# Patient Record
Sex: MALE | Race: WHITE
[De-identification: names, ages, dates, MRNs, and addresses within clinical notes are randomized per-mention and may not be internally consistent; named-entity substitution may affect disease eponyms.]

---

## 2019-02-07 ENCOUNTER — HOSPITAL ENCOUNTER (EMERGENCY)
Age: 69
Discharge: HOME | End: 2019-02-07
Payer: MEDICARE

## 2019-02-07 VITALS
BODY MASS INDEX: 28 KG/M2 | SYSTOLIC BLOOD PRESSURE: 107 MMHG | TEMPERATURE: 100.94 F | DIASTOLIC BLOOD PRESSURE: 57 MMHG | HEART RATE: 76 BPM | OXYGEN SATURATION: 95 % | RESPIRATION RATE: 18 BRPM

## 2019-02-07 VITALS
HEART RATE: 72 BPM | RESPIRATION RATE: 14 BRPM | DIASTOLIC BLOOD PRESSURE: 66 MMHG | TEMPERATURE: 100.5 F | OXYGEN SATURATION: 95 % | SYSTOLIC BLOOD PRESSURE: 112 MMHG

## 2019-02-07 DIAGNOSIS — T14.8XXA: Primary | ICD-10-CM

## 2019-02-07 DIAGNOSIS — R50.82: ICD-10-CM

## 2019-02-07 LAB
ADD MANUAL DIFF / SLIDE REVIEW: NO
APPEARANCE UR: CLEAR
BILIRUBIN URINE UA: NEGATIVE
BUN SERPL-MCNC: 14 MG/DL (ref 9–20)
CALCIUM SERPL-MCNC: 8.5 MG/DL (ref 8.4–10.2)
CHLORIDE SERPL-SCNC: 98 MMOL/L (ref 98–107)
CO2 SERPL-SCNC: 30 MMOL/L (ref 22–32)
COLOR UR: (no result)
ESTIMATED GLOMERULAR FILT RATE: > 60 ML/MIN (ref 60–?)
GLUCOSE SERPL-MCNC: 166 MG/DL (ref 80–110)
GLUCOSE URINE UA: NEGATIVE G/DL
HEMATOCRIT: 36.7 % (ref 41–53)
HEMOGLOBIN: 12.5 G/DL (ref 13.5–17.5)
HEMOLYSIS: < 15 (ref 0–50)
HGB UR QL: NEGATIVE
KETONES URINE UA: (no result)
LACTATE SERPL-MCNC: 1 MMOL/L (ref 0.7–2.1)
LEUKOCYTE ESTERASE URINE UA: NEGATIVE
LYMPHOCYTES # SPEC AUTO: 600 /UL (ref 1100–4500)
MCV RBC: 98 FL (ref 80–100)
MEAN CORPUSCULAR HEMOGLOBIN: 33.4 PG (ref 26–34)
MEAN CORPUSCULAR HGB CONC: 34.1 % (ref 30–36)
NITRITE URINE UA: NEGATIVE
PH UR: 5.5 [PH] (ref 4.5–8)
PLATELET COUNT: 123 X10^3/UL (ref 150–400)
POTASSIUM SERPL-SCNC: 4 MMOL/L (ref 3.4–5.1)
PROTEIN URINE UA: (no result)
SODIUM SERPL-SCNC: 134 MMOL/L (ref 137–145)
SP GR UR: 1.02 (ref 1–1.03)
UROBILINOGEN UR QL: 0.2 E.U./DL

## 2019-02-07 PROCEDURE — 81001 URINALYSIS AUTO W/SCOPE: CPT

## 2019-02-07 PROCEDURE — 85025 COMPLETE CBC W/AUTO DIFF WBC: CPT

## 2019-02-07 PROCEDURE — 96360 HYDRATION IV INFUSION INIT: CPT

## 2019-02-07 PROCEDURE — 93971 EXTREMITY STUDY: CPT

## 2019-02-07 PROCEDURE — 83605 ASSAY OF LACTIC ACID: CPT

## 2019-02-07 PROCEDURE — 99284 EMERGENCY DEPT VISIT MOD MDM: CPT

## 2019-02-07 PROCEDURE — 87040 BLOOD CULTURE FOR BACTERIA: CPT

## 2019-02-07 PROCEDURE — 36415 COLL VENOUS BLD VENIPUNCTURE: CPT

## 2019-02-07 PROCEDURE — 99283 EMERGENCY DEPT VISIT LOW MDM: CPT

## 2019-02-07 PROCEDURE — 80048 BASIC METABOLIC PNL TOTAL CA: CPT

## 2019-02-07 PROCEDURE — 36591 DRAW BLOOD OFF VENOUS DEVICE: CPT

## 2019-02-07 NOTE — ED_ITS
"HPI - Extremity Injury (Lower)    
General    
Chief Complaint: Extremity Injury, Lower    
Stated Complaint: Bleeding at dressing    
Time Seen by Provider: 02/07/19 03:49    
Source: patient and EMS    
Mode of arrival: EMS    
Limitations: no limitations    
History of Present Illness    
HPI Narrative: Patient is a 68-year-old male who presents with left his knee   
bleeding.  He had a total knee arthroplasty is 3 days ago he was discharged the   
2 days ago.  Yesterday he started his exercises. this evening he noticed that   
there was bleeding at the incision site and there was her very large blood clot   
which drip from the bandage.  He does have some postoperative swelling. He is   
noted to have low-grade fever of 100.9.  He denies knowing of any fever before   
this.  He was feeling fine earlier.    
Related Data    
                                Home Medications    
    
    
    
 Medication  Instructions  Recorded  Confirmed    
     
[flaxseed oil] #0 01/17/17     
     
ascorbic acid (vitamin C) 500 mg PO QDAY #0 01/17/17     
     
cholecalciferol (vitamin D3) 1,000 unit PO QDAY #0 01/17/17     
    
[Vitamin D3]       
     
ginkgo biloba 120 mg PO #0 01/17/17     
     
multivitamin [Multiple Vitamins] 1 tab PO QDAY #0 01/17/17     
     
omega 3-dha-epa-fish oil [Fish Oil] 1,000 mg PO #0 01/17/17     
    
    
    
                                    Allergies    
    
    
    
Allergy/AdvReac Type Severity Reaction Status Date / Time    
     
azithromycin [From ZITHROMAX] Allergy Unknown  Verified 02/07/19 04:03    
     
oxycodone [OXYCODONE] Allergy Unknown RASH Verified 02/07/19 04:03    
     
Sulfa (Sulfonamide Allergy Unknown  Verified 02/07/19 04:03    
    
Antibiotics)         
    
[SULFA (SULFONAMIDE         
    
ANTIBIOTICS)]         
    
    
    
    
Review of Systems    
Review of Systems    
ROS Unobtainable: All systems reviewed & are unremarkable except as noted in HPI  
and below    
Constitutional    
Denies body ache(s), Denies chills and Reports fever(s)    
Eyes    
Denies change in vision, Denies eye discharge, Denies irritation and Denies loss  
of vision    
Cardiovascular    
Denies chest pain, Denies irregular heart rhythm, Denies lightheadedness, Denies  
palpitations, Denies dyspnea, Denies dyspnea on exertion and Denies orthopnea    
Respiratory    
Denies cough, Denies dyspnea, Denies dyspnea on exertion and Denies wheezing    
Gastrointestinal    
Gastrointestinal: Denies abdominal pain, Denies change in bowel habits, Denies   
diarrhea, Denies nausea and Denies vomiting    
Genitourinary    
Denies hematuria, Denies flank pain, Denies urinary incontinence and Denies   
urinary urgency    
Musculoskeletal    
Denies back pain, Denies muscle weakness, Denies numbness and Denies tingling    
Integumentary/Breasts    
Denies pruritus, Denies erythema, Denies rash and Denies wounds    
Neurologic    
Denies loss of vision, Denies numbness and Denies tingling    
Endocrine    
Denies palpitations    
Allergic/Immunologic    
Denies wheezing    
    
PFSH    
Medical History (Reviewed 02/07/19 @ 04:55 by Rylee Huber DO)    
    
Hypertension (Chronic)    
    
    
Surgical History (Reviewed 02/07/19 @ 04:55 by Rylee Huber DO)    
    
H/O total knee replacement (Acute)    
    
Social History (Reviewed 02/07/19 @ 04:55 by Rylee Huber DO)    
Smoking Status:  Never smoker     
    
    
Social History (Reviewed 02/07/19 @ 04:55 by Rylee Huber DO)    
Smoking Status:  Never smoker     
    
    
    
Exam    
Initial Vital Signs    
Initial Vital Signs:               Vital Signs    
    
    
    
Temperature  100.9 F H  02/07/19 03:30    
     
Pulse Rate  76   02/07/19 03:30    
     
Respiratory Rate  18   02/07/19 03:30    
     
Blood Pressure  107/57 L  02/07/19 03:30    
     
Pulse Oximetry  95   02/07/19 03:30    
    
    
[id number]|2019-02-07 03:52:00|2019-02-07 07:23:00|DI.US.S_ITS|[facility]|Imaging|[id number]|"PROCEDURE:  US PERIPH VENOUS LOW EXTREM LT

## 2019-02-07 NOTE — ED.LOWEXIN
"HPI - Extremity Injury (Lower)
General
Chief Complaint: Extremity Injury, Lower
Stated Complaint: Bleeding at dressing
Time Seen by Provider: 02/07/19 03:49
Source: patient and EMS
Mode of arrival: EMS
Limitations: no limitations
History of Present Illness
HPI Narrative: Patient is a 68-year-old male who presents with left his knee bleeding.  He had a total knee arthroplasty is 3 days ago he was discharged the 2 days ago.  Yesterday he started his exercises. this evening he noticed that there was 
bleeding at the incision site and there was her very large blood clot which drip from the bandage.  He does have some postoperative swelling. He is noted to have low-grade fever of 100.9.  He denies knowing of any fever before this.  He was feeling 
fine earlier.
Related Data
Home Medications

 Medication  Instructions  Recorded  Confirmed
[flaxseed oil] #0 01/17/17 
ascorbic acid (vitamin C) 500 mg PO QDAY #0 01/17/17 
cholecalciferol (vitamin D3) 1,000 unit PO QDAY #0 01/17/17 
[Vitamin D3]   
ginkgo biloba 120 mg PO #0 01/17/17 
multivitamin [Multiple Vitamins] 1 tab PO QDAY #0 01/17/17 
omega 3-dha-epa-fish oil [Fish Oil] 1,000 mg PO #0 01/17/17 


Allergies

Allergy/AdvReac Type Severity Reaction Status Date / Time
azithromycin [From ZITHROMAX] Allergy Unknown  Verified 02/07/19 04:03
oxycodone [OXYCODONE] Allergy Unknown RASH Verified 02/07/19 04:03
Sulfa (Sulfonamide Allergy Unknown  Verified 02/07/19 04:03
Antibiotics)     
[SULFA (SULFONAMIDE     
ANTIBIOTICS)]     



Review of Systems
Review of Systems
ROS Unobtainable: All systems reviewed & are unremarkable except as noted in HPI and below
Constitutional
Denies body ache(s), Denies chills and Reports fever(s)
Eyes
Denies change in vision, Denies eye discharge, Denies irritation and Denies loss of vision
Cardiovascular
Denies chest pain, Denies irregular heart rhythm, Denies lightheadedness, Denies palpitations, Denies dyspnea, Denies dyspnea on exertion and Denies orthopnea
Respiratory
Denies cough, Denies dyspnea, Denies dyspnea on exertion and Denies wheezing
Gastrointestinal
Gastrointestinal: Denies abdominal pain, Denies change in bowel habits, Denies diarrhea, Denies nausea and Denies vomiting
Genitourinary
Denies hematuria, Denies flank pain, Denies urinary incontinence and Denies urinary urgency
Musculoskeletal
Denies back pain, Denies muscle weakness, Denies numbness and Denies tingling
Integumentary/Breasts
Denies pruritus, Denies erythema, Denies rash and Denies wounds
Neurologic
Denies loss of vision, Denies numbness and Denies tingling
Endocrine
Denies palpitations
Allergic/Immunologic
Denies wheezing

[facility]
Medical History (Reviewed 02/07/19 @ 04:55 by Rylee Huber DO)

Hypertension (Chronic)


Surgical History (Reviewed 02/07/19 @ 04:55 by Rylee Huber DO)

H/O total knee replacement (Acute)

Social History (Reviewed 02/07/19 @ 04:55 by Rylee Huber DO)
Smoking Status:  Never smoker 


Social History (Reviewed 02/07/19 @ 04:55 by Rylee Huber DO)
Smoking Status:  Never smoker 



Exam
Initial Vital Signs
Initial Vital Signs:  Vital Signs

Temperature  100.9 F H  02/07/19 03:30
Pulse Rate  76   02/07/19 03:30
Respiratory Rate  18   02/07/19 03:30
Blood Pressure  107/57 L  02/07/19 03:30
Pulse Oximetry  95   02/07/19 03:30

GENERAL: Well-appearing, well-nourished and in no acute distress.
HEENT: Head atraumatic,EOMI, pupils reactive
CARDIOVASCULAR: Regular rate and rhythm without murmurs, rubs or gallops.
RESPIRATORY: Breath sounds equal bilaterally, no wheezes rales or rhonchi.
ABDOMEN: Soft, nontender.  Normoactive bowel sounds all 4 quadrants.  No guarding or rebound.
EXTREMITIES: Normal range of motion, no clubbing or edema.  Neurovascularly intact
 Left knee:  Postoperative changes noted some swelling.  No erythema.  Incision site is clean and dry small and at the most distal end of the incision there is some oozing.  Is easily controlled with pr
[id number]|2019-02-07 04:00:00|2019-02-07 04:00:00|PC.NURSE||||"Pt s/p left knee surgery on 2/4/18, states went to bathroom and knee bleeding beyond aquacell bandage applied. Bandage removed, surgical site bleeding at base of incision, Dr in room applied surgicel and coban to knee temporarily then replaced with

## 2019-02-07 NOTE — PC.NURSE
and RN updated pt on wait for urine still pending in lab due to extended wait time for results per lab to come back to ER.  Dr Huber spoke with  to get a status of pending urine and gave pt option to DC and pt would be notified of 
results in which pt declined and chose to wait for urine results prior to DC.

## 2019-10-30 ENCOUNTER — HOSPITAL ENCOUNTER (EMERGENCY)
Age: 69
Discharge: HOME | End: 2019-10-30
Payer: MEDICARE

## 2019-10-30 VITALS
DIASTOLIC BLOOD PRESSURE: 85 MMHG | HEART RATE: 80 BPM | OXYGEN SATURATION: 99 % | RESPIRATION RATE: 20 BRPM | SYSTOLIC BLOOD PRESSURE: 177 MMHG

## 2019-10-30 VITALS
OXYGEN SATURATION: 98 % | DIASTOLIC BLOOD PRESSURE: 80 MMHG | RESPIRATION RATE: 18 BRPM | SYSTOLIC BLOOD PRESSURE: 174 MMHG | HEART RATE: 76 BPM

## 2019-10-30 VITALS
HEART RATE: 78 BPM | DIASTOLIC BLOOD PRESSURE: 80 MMHG | OXYGEN SATURATION: 99 % | RESPIRATION RATE: 18 BRPM | SYSTOLIC BLOOD PRESSURE: 159 MMHG

## 2019-10-30 VITALS
HEART RATE: 80 BPM | SYSTOLIC BLOOD PRESSURE: 176 MMHG | RESPIRATION RATE: 22 BRPM | OXYGEN SATURATION: 97 % | DIASTOLIC BLOOD PRESSURE: 82 MMHG

## 2019-10-30 VITALS — TEMPERATURE: 97.5 F

## 2019-10-30 DIAGNOSIS — R20.0: ICD-10-CM

## 2019-10-30 DIAGNOSIS — I10: Primary | ICD-10-CM

## 2019-10-30 LAB
ADD MANUAL DIFF / SLIDE REVIEW: NO
ALBUMIN SERPL-MCNC: 4.5 G/DL (ref 3.5–5)
ALBUMIN/GLOB SERPL: 1.5 {RATIO} (ref 1–2.8)
ALP SERPL-CCNC: 62 U/L (ref 38–126)
ALT SERPL-CCNC: 26 IU/L (ref 21–72)
B TYPE NATRIURETIC PEPTIDE: < 100 (ref ?–100)
BUN SERPL-MCNC: 19 MG/DL (ref 9–20)
CALCIUM SERPL-MCNC: 9.5 MG/DL (ref 8.4–10.2)
CHLORIDE SERPL-SCNC: 102 MMOL/L (ref 98–107)
CK SERPL-CCNC: 83 U/L (ref 55–170)
CKMB % RELATIVE INDEX: (no result) % (ref 1.5–5)
CO2 SERPL-SCNC: 29 MMOL/L (ref 22–32)
ESTIMATED GLOMERULAR FILT RATE: > 60 ML/MIN (ref 60–?)
GLOBULIN SER CALC-MCNC: 3 G/DL (ref 1.7–4.1)
GLUCOSE SERPL-MCNC: 114 MG/DL (ref 80–110)
HEMATOCRIT: 41.9 % (ref 41–53)
HEMOGLOBIN: 14.3 G/DL (ref 13.5–17.5)
HEMOLYSIS: 39 (ref 0–50)
LIPASE SERPL-CCNC: 123 U/L (ref 23–300)
LYMPHOCYTES # SPEC AUTO: 1700 /UL (ref 1100–4500)
MCV RBC: 93.6 FL (ref 80–100)
MEAN CORPUSCULAR HEMOGLOBIN: 32 PG (ref 26–34)
MEAN CORPUSCULAR HGB CONC: 34.2 % (ref 30–36)
PLATELET COUNT: 163 X10^3/UL (ref 150–400)
POTASSIUM SERPL-SCNC: 4 MMOL/L (ref 3.4–5.1)
PROT SERPL-MCNC: 7.5 G/DL (ref 6.3–8.2)
SODIUM SERPL-SCNC: 139 MMOL/L (ref 137–145)
TROPONIN I SERPL-MCNC: < 0.012 NG/ML (ref 0.01–0.03)

## 2019-10-30 PROCEDURE — 71045 X-RAY EXAM CHEST 1 VIEW: CPT

## 2019-10-30 PROCEDURE — 82550 ASSAY OF CK (CPK): CPT

## 2019-10-30 PROCEDURE — 84484 ASSAY OF TROPONIN QUANT: CPT

## 2019-10-30 PROCEDURE — 93005 ELECTROCARDIOGRAM TRACING: CPT

## 2019-10-30 PROCEDURE — 83880 ASSAY OF NATRIURETIC PEPTIDE: CPT

## 2019-10-30 PROCEDURE — 80053 COMPREHEN METABOLIC PANEL: CPT

## 2019-10-30 PROCEDURE — 70450 CT HEAD/BRAIN W/O DYE: CPT

## 2019-10-30 PROCEDURE — 36415 COLL VENOUS BLD VENIPUNCTURE: CPT

## 2019-10-30 PROCEDURE — 96360 HYDRATION IV INFUSION INIT: CPT

## 2019-10-30 PROCEDURE — 99283 EMERGENCY DEPT VISIT LOW MDM: CPT

## 2019-10-30 PROCEDURE — 99285 EMERGENCY DEPT VISIT HI MDM: CPT

## 2019-10-30 PROCEDURE — 85025 COMPLETE CBC W/AUTO DIFF WBC: CPT

## 2019-10-30 PROCEDURE — 83690 ASSAY OF LIPASE: CPT

## 2019-10-30 NOTE — PC.NURSE
He has no pain,no sob,the right arm numb sensation is not new he told me.He said he has had it for years.

## 2019-10-30 NOTE — DI.CT.S_ITS
PROCEDURE:  CT HEAD/BRAIN WO CON  
   
INDICATIONS:  Hypertension, numbness of left arm and left leg  
   
TECHNIQUE:    
Noncontrast 4.5 mm thick angled axial sections acquired from the foramen magnum to the   
vertex, with coronal and sagittal reformats.  For radiation dose reduction, the following   
was used:  automated exposure control, adjustment of mA and/or kV according to patient   
size.    
   
COMPARISON:  None.  
   
FINDINGS:    
Image quality:  Excellent.    
   
CSF spaces:  Basal cisterns are patent.  No extra-axial fluid collections.  The   
ventricles are symmetric in size and shape.    
   
Brain:  No intracranial bleeds or masses.  There is cerebral volume loss for age, with   
resultant ventricular and sulcal prominence.  There are periventricular and deep white   
matter chronic small vessel ischemic changes.  There is intracranial internal carotid   
artery atherosclerosis.    
   
Skull and face:  Calvarium and visualized facial bones appear intact, without suspicious   
lesions.    
   
Sinuses: Mucosal thickening noted in the right sphenoid sinus. And the visualized right   
maxillary sinus. mastoids are clear.    
   
IMPRESSION:  No acute intracranial disease process.  
   
   
   
Dictated by: Tammy Ricci MD, PhD on 10/30/2019 at 7:31       
Approved by: Tammy Ricci MD, PhD on 10/30/2019 at 7:33

## 2019-10-30 NOTE — DI.RAD.S_ITS
PROCEDURE:  XR CHEST 1V  
   
INDICATIONS:  chest pain, hypertension  
   
TECHNIQUE:  One view of the chest was acquired.    
   
COMPARISON: Single view chest radiograph 12/9/17, [facility].  
   
FINDINGS:    
   
Surgical changes and devices:  None.    
   
Lungs and pleura:  There is bilateral prominence of the pulmonary vasculature. There are   
increased bilateral predominantly perihilar linear opacities. There are also hazy and   
linear opacities of the medial right lung base. No pleural effusions or pneumothorax.    
   
Mediastinum:  Mediastinal contours appear normal.  Heart size is normal.    
   
Bones and chest wall:  No suspicious bony lesions.  Overlying soft tissues appear   
unremarkable.    
   
IMPRESSION:    
1. Increased bilateral predominantly perihilar linear opacities most consistent with   
atelectasis and prominent pulmonary vasculature, versus findings of early minimal   
pulmonary edema.  
2. Medial right lung base opacities likely represent atelectasis, although aspiration or   
pneumonia could appear similar. Consider followup chest radiographs if there is continued   
clinical concern.  
   
   
Dictated by: Dieudonne Sinclair M.D. on 10/30/2019 at 9:12       
Approved by: Dieudonne Sinclair M.D. on 10/30/2019 at 9:21

## 2019-10-30 NOTE — ED_ITS
"HPI - General Adult    
General    
Chief complaint: Hypertension    
Stated complaint: woke w/left arm numbness/don't feel right bp high    
Time Seen by Provider: 10/30/19 04:24    
Source: patient    
Mode of arrival: Ambulatory    
Limitations: no limitations    
History of Present Illness    
HPI narrative: 69-year-old male nonsmoker with history of hypertension and   
hyperlipidemia awoke feeling ?funny? and checked his blood pressure noted to be   
elevated, he has checked it a few times and at most noted to be in the 200s over  
about 110. He denies any headache or blurred vision.  He has had no difficulty   
with speech nor chest pain or shortness of breath.  He does complain of some   
tingling in atypical feeling in his left arm and mentions that his left knee   
feels weird also.  He then clarifies and states that his left arm has been   
having symptoms like this since the 1970s He denies missing any medications nor   
has he had any alterations in the dosages.  He receives his primary care at the   
[facility] in [place].  He denies any provocation or palliation of the numbness   
and tingling in his left arm.  He states that exertion does not change things   
nor does moving his arm.  He went to bed feeling normal at 1:00 a.m. and woke up  
at 2:30 a.m. with these symptoms     
Onset (ago): hour(s)    
Location: left, upper extremity and lower extremity    
Radiation: non-radiation    
Relieving factors: none    
Exacerbating factors: none    
Treatments prior to arrival: none    
Related Data    
                                Home Medications    
    
    
    
 Medication  Instructions  Recorded  Confirmed    
     
[flaxseed oil] #0 01/17/17     
     
ascorbic acid (vitamin C) 500 mg PO QDAY #0 01/17/17     
     
cholecalciferol (vitamin D3) 1,000 unit PO QDAY #0 01/17/17     
    
[Vitamin D3]       
     
ginkgo biloba 120 mg PO #0 01/17/17     
     
multivitamin [Multiple Vitamins] 1 tab PO QDAY #0 01/17/17     
     
omega 3-dha-epa-fish oil [Fish Oil] 1,000 mg PO #0 01/17/17     
    
    
    
                                    Allergies    
    
    
    
Allergy/AdvReac Type Severity Reaction Status Date / Time    
     
azithromycin [From ZITHROMAX] Allergy Unknown  Verified 02/07/19 04:03    
     
oxycodone [OXYCODONE] Allergy Unknown RASH Verified 02/07/19 04:03    
     
Sulfa (Sulfonamide Allergy Unknown  Verified 02/07/19 04:03    
    
Antibiotics)         
    
[SULFA (SULFONAMIDE         
    
ANTIBIOTICS)]         
    
    
    
    
Review of Systems    
Constitutional    
Constitutional: Denies chills, Denies fatigue, Denies fever(s), Denies frequent   
falls, Denies lethargy and Denies weakness    
Eyes    
Eyes: Denies change in vision, Denies eye discharge, Denies irritation and   
Denies loss of vision    
ENT    
Ears, Nose, Mouth, and Throat: Denies change in voice, Denies dizziness, Denies   
neck pain, Denies sore throat and Denies throat swelling    
Cardiovascular    
Cardiovascular: Denies chest pain, Denies irregular heart rhythm, Denies   
lightheadedness, Denies palpitations, Denies dyspnea, Denies dyspnea on exertion  
and Denies orthopnea    
Respiratory    
Respiratory: Denies cough, Denies dyspnea, Denies dyspnea on exertion and Denies  
wheezing    
Gastrointestinal    
Gastrointestinal: Denies abdominal pain, Denies change in bowel habits, Denies   
diarrhea, Denies nausea and Denies vomiting    
Genitourinary    
Genitourinary: Denies hematuria, Denies flank pain, Denies urinary incontinence   
and Denies urinary urgency    
Musculoskeletal    
Musculoskeletal: Denies back pain, Denies muscle weakness, Denies neck pain,   
Denies numbness and Reports tingling    
Integumentary/Breasts    
Skin/Breast: Denies pruritus, Denies erythema, Denies rash and Denies wounds    
Neurologic    
Neurologic: Denies behavioral changes, Denies confusion, Denies dizziness,   

[id number]|2019-10-30 04:26:36|2019-10-30 04:26:36|ED.GENADULT||||"HPI - General Adult

## 2020-12-28 ENCOUNTER — HOSPITAL ENCOUNTER (EMERGENCY)
Age: 70
Discharge: HOME | End: 2020-12-28
Payer: MEDICARE

## 2020-12-28 VITALS
OXYGEN SATURATION: 98 % | TEMPERATURE: 98.24 F | RESPIRATION RATE: 20 BRPM | DIASTOLIC BLOOD PRESSURE: 86 MMHG | HEART RATE: 73 BPM | SYSTOLIC BLOOD PRESSURE: 162 MMHG

## 2020-12-28 DIAGNOSIS — I10: ICD-10-CM

## 2020-12-28 DIAGNOSIS — R22.42: Primary | ICD-10-CM

## 2020-12-28 PROCEDURE — 99283 EMERGENCY DEPT VISIT LOW MDM: CPT

## 2020-12-28 PROCEDURE — 93971 EXTREMITY STUDY: CPT

## 2020-12-28 NOTE — DI.US.S_ITS
PROCEDURE:  US PERIP VENOUS LOW EXTREM LT  
   
INDICATIONS:  PAIN, SWELLING, RECENT SURGERY. PLEASE GO BELOW KNEE  
   
TECHNIQUE:    
Real-time imaging, as well as color and pulse Doppler interrogation, were performed of   
the lower extremity deep veins from the inguinal ligament to the popliteal fossa.    
   
COMPARISON:  [facility], , [facility] VENOUS LOW EXTREM LT, 2/07/2019, 4:48.  
   
FINDINGS:  The common femoral, femoral and popliteal veins are normally compressible, and   
free of intraluminal thrombus.  Color and pulse Doppler demonstrate normal phasic   
intraluminal flow.  There is normal augmentation response to distal compression maneuver.   
   
   
IMPRESSION:  No DVT found left lower extremity.  
   
   
Dictated by: Yusuf Blair M.D. on 12/28/2020 at 16:17       
Approved by: Yusuf Blair M.D. on 12/28/2020 at 16:18

## 2020-12-29 NOTE — ED_ITS
"HPI - Extremity Problem    
General    
Chief complaint: Extremity Problem,Nontraumatic    
Stated complaint: Suspected Blood Clot, Lt Foot, Post Op 12/07    
Time Seen by Provider: 12/28/20 15:11    
Source: patient    
Mode of arrival: Ambulatory    
Limitations: no limitations    
History of Present Illness    
HPI Narrative: 70-year-old male nonsmoker with benign medical history presents   
with his wife in the chief complaint of some swelling in the toes of his left   
foot in the recent aftermath of an orthopedic surgery performed in [place].  He   
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   
of a possible clot.  He has been on antibiotics for possible wound infection or   
cellulitis also.  He denies systemic findings such as fever chills nor nausea or  
vomiting.  He denies any exposure to persons known with COVID.  He is otherwise   
well and free of complaint.  He denies any significant increasing pain or   
drainage.  He has no pain or swelling in his calf or thigh, only the toes    
MD Complaint: extremity pain and joint swelling    
Onset (ago): day(s)    
Pain Consistency: constant    
Location: left    
Quality: aching    
Radiation: none    
Relieving factors: rest    
Exacerbating factors: weight bearing and palpation    
Associated symptoms: denies other symptoms    
Related Data    
                                Home Medications    
    
    
    
 Medication  Instructions  Recorded  Confirmed    
     
[flaxseed oil] #0 01/17/17     
     
ascorbic acid (vitamin C) 500 mg PO QDAY #0 01/17/17     
     
cholecalciferol (vitamin D3) 1,000 unit PO QDAY #0 01/17/17     
    
[Vitamin D3]       
     
ginkgo biloba 120 mg PO #0 01/17/17     
     
multivitamin [Multiple Vitamins] 1 tab PO QDAY #0 01/17/17     
     
omega 3-dha-epa-fish oil [Fish Oil] 1,000 mg PO #0 01/17/17     
    
    
    
                                    Allergies    
    
    
    
Allergy/AdvReac Type Severity Reaction Status Date / Time    
     
azithromycin [From ZITHROMAX] Allergy Unknown  Verified 02/07/19 04:03    
     
oxycodone [OXYCODONE] Allergy Unknown RASH Verified 02/07/19 04:03    
     
Sulfa (Sulfonamide Allergy Unknown  Verified 02/07/19 04:03    
    
Antibiotics)         
    
[SULFA (SULFONAMIDE         
    
ANTIBIOTICS)]         
    
    
    
    
Review of Systems    
Constitutional    
Constitutional: Denies chills, Denies fatigue, Denies fever(s), Denies frequent   
falls, Denies lethargy and Denies weakness    
Eyes    
Eyes: Denies change in vision, Denies eye discharge, Denies irritation and   
Denies loss of vision    
ENT    
Ears, Nose, Mouth, and Throat: Denies change in voice, Denies dizziness, Denies   
neck pain, Denies sore throat and Denies throat swelling    
Cardiovascular    
Cardiovascular: Denies chest pain, Denies irregular heart rhythm, Denies   
lightheadedness, Denies palpitations, Denies dyspnea, Denies dyspnea on exertion  
and Denies orthopnea    
Respiratory    
Respiratory: Denies cough, Denies dyspnea, Denies dyspnea on exertion and Denies  
wheezing    
Gastrointestinal    
Gastrointestinal: Denies abdominal pain, Denies change in bowel habits, Denies   
diarrhea, Denies nausea and Denies vomiting    
Musculoskeletal    
Musculoskeletal: Denies neck pain and Denies numbness    
Integumentary/Breasts    
Skin/Breast: Denies pruritus, Reports erythema, Denies rash, Reports skin pain,   
Reports skin swelling and Reports wounds    
Neurologic    
Neurologic: Denies behavioral changes, Denies confusion, Denies dizziness,   
Denies frequent falls, Denies loss of vision, Denies numbness and Denies   
weakness    
Psychiatric    
Psychiatric: Denies anxiety, Denies behavioral changes, Denies confusion, Denies  
depression, Denies homicidal ideation and Denies suicidal ideation    
Endocrine    
Endocrine: Denies fatigue, Denies flushing and De
[id number]|2020-12-29 10:10:15|2020-12-29 10:10:15|ED.EXTPRO||||"HPI - Extremity Problem

## 2021-10-17 ENCOUNTER — HOSPITAL ENCOUNTER (EMERGENCY)
Age: 71
Discharge: HOME | End: 2021-10-17
Payer: MEDICARE

## 2021-10-17 VITALS
HEART RATE: 68 BPM | DIASTOLIC BLOOD PRESSURE: 85 MMHG | SYSTOLIC BLOOD PRESSURE: 139 MMHG | RESPIRATION RATE: 16 BRPM | OXYGEN SATURATION: 96 %

## 2021-10-17 VITALS
RESPIRATION RATE: 18 BRPM | TEMPERATURE: 97.7 F | OXYGEN SATURATION: 99 % | HEART RATE: 65 BPM | DIASTOLIC BLOOD PRESSURE: 80 MMHG | SYSTOLIC BLOOD PRESSURE: 151 MMHG

## 2021-10-17 VITALS — BODY MASS INDEX: 29.5 KG/M2
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
## 2021-10-17 DIAGNOSIS — S81.801A: Primary | ICD-10-CM
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
## 2021-10-17 PROCEDURE — 99281 EMR DPT VST MAYX REQ PHY/QHP: CPT

## 2021-10-19 ENCOUNTER — HOSPITAL ENCOUNTER (EMERGENCY)
Age: 71
Discharge: HOME | End: 2021-10-19
Payer: MEDICARE

## 2021-10-19 VITALS
HEART RATE: 87 BPM | SYSTOLIC BLOOD PRESSURE: 168 MMHG | RESPIRATION RATE: 18 BRPM | OXYGEN SATURATION: 99 % | DIASTOLIC BLOOD PRESSURE: 79 MMHG | TEMPERATURE: 98.1 F

## 2021-10-19 VITALS
OXYGEN SATURATION: 97 % | SYSTOLIC BLOOD PRESSURE: 167 MMHG | RESPIRATION RATE: 17 BRPM | DIASTOLIC BLOOD PRESSURE: 95 MMHG | HEART RATE: 70 BPM

## 2021-10-19 DIAGNOSIS — S81.801D: Primary | ICD-10-CM

## 2021-10-19 PROCEDURE — 99284 EMERGENCY DEPT VISIT MOD MDM: CPT

## 2021-10-19 PROCEDURE — 99283 EMERGENCY DEPT VISIT LOW MDM: CPT

## 2021-10-19 PROCEDURE — 93971 EXTREMITY STUDY: CPT

## 2022-01-03 ENCOUNTER — HOSPITAL ENCOUNTER (EMERGENCY)
Age: 72
LOS: 1 days | Discharge: HOME | End: 2022-01-04
Payer: MEDICARE

## 2022-01-03 VITALS
TEMPERATURE: 97.5 F | HEART RATE: 94 BPM | RESPIRATION RATE: 18 BRPM | SYSTOLIC BLOOD PRESSURE: 176 MMHG | DIASTOLIC BLOOD PRESSURE: 93 MMHG | OXYGEN SATURATION: 97 %

## 2022-01-03 VITALS — SYSTOLIC BLOOD PRESSURE: 180 MMHG | DIASTOLIC BLOOD PRESSURE: 96 MMHG

## 2022-01-03 VITALS — BODY MASS INDEX: 29.5 KG/M2

## 2022-01-03 DIAGNOSIS — I10: Primary | ICD-10-CM

## 2022-01-03 PROCEDURE — 99283 EMERGENCY DEPT VISIT LOW MDM: CPT

## 2022-01-03 PROCEDURE — 80048 BASIC METABOLIC PNL TOTAL CA: CPT

## 2022-01-03 PROCEDURE — 82550 ASSAY OF CK (CPK): CPT

## 2022-01-03 PROCEDURE — 85025 COMPLETE CBC W/AUTO DIFF WBC: CPT

## 2022-01-03 PROCEDURE — 93010 ELECTROCARDIOGRAM REPORT: CPT

## 2022-01-03 PROCEDURE — 84484 ASSAY OF TROPONIN QUANT: CPT

## 2022-01-03 PROCEDURE — 36415 COLL VENOUS BLD VENIPUNCTURE: CPT

## 2022-01-04 VITALS
HEART RATE: 87 BPM | DIASTOLIC BLOOD PRESSURE: 97 MMHG | OXYGEN SATURATION: 97 % | RESPIRATION RATE: 16 BRPM | SYSTOLIC BLOOD PRESSURE: 169 MMHG

## 2022-01-04 LAB
ADD MANUAL DIFF / SLIDE REVIEW: NO
BUN SERPL-MCNC: 21 MG/DL (ref 9–20)
CALCIUM SERPL-MCNC: 9.2 MG/DL (ref 8.4–10.2)
CHLORIDE SERPL-SCNC: 105 MMOL/L (ref 98–107)
CK SERPL-CCNC: 82 U/L (ref 55–170)
CKMB % RELATIVE INDEX: (no result) % (ref 1.5–5)
CO2 SERPL-SCNC: 27 MMOL/L (ref 22–32)
ESTIMATED GLOMERULAR FILT RATE: > 60 ML/MIN (ref 60–?)
GLUCOSE SERPL-MCNC: 109 MG/DL (ref 80–110)
HEMATOCRIT: 43.6 % (ref 41–53)
HEMOGLOBIN: 15.1 G/DL (ref 13.5–17.5)
HEMOLYSIS: 28 (ref 0–50)
LYMPHOCYTES # SPEC AUTO: 800 /UL (ref 1100–4500)
MCV RBC: 97.3 FL (ref 80–100)
MEAN CORPUSCULAR HEMOGLOBIN: 33.6 PG (ref 26–34)
MEAN CORPUSCULAR HGB CONC: 34.6 % (ref 30–36)
PLATELET COUNT: 141 X10^3/UL (ref 150–400)
POTASSIUM SERPL-SCNC: 4.3 MMOL/L (ref 3.4–5.1)
SODIUM SERPL-SCNC: 137 MMOL/L (ref 137–145)
TROPONIN I SERPL-MCNC: < 0.012 NG/ML (ref 0.01–0.03)

## 2023-10-28 ENCOUNTER — HOSPITAL ENCOUNTER (OUTPATIENT)
Age: 73
End: 2023-10-28
Payer: MEDICARE

## 2023-10-28 DIAGNOSIS — J02.9: Primary | ICD-10-CM

## 2023-10-28 PROCEDURE — 87070 CULTURE OTHR SPECIMN AEROBIC: CPT

## 2024-12-13 ENCOUNTER — HOSPITAL ENCOUNTER (OUTPATIENT)
Age: 74
End: 2024-12-13
Payer: MEDICARE
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
## 2024-12-13 DIAGNOSIS — S89.92XA: Primary | ICD-10-CM

## 2024-12-13 PROCEDURE — 73590 X-RAY EXAM OF LOWER LEG: CPT

## 2024-12-13 NOTE — DI.RAD.S_ITS
PROCEDURE:  XR TIBIA FIBULA LT 2V 
  
INDICATIONS:  Left lower leg injury 
  
TECHNIQUE:  2 views of the tibia and fibula were acquired.   
  
COMPARISON:  None. 
  
FINDINGS:   
  
Bones:  No fractures or dislocations.  Left knee arthroplasty.  No suspicious bony  
lesions.   
  
Soft tissues:  No suspicious soft tissue calcifications or masses.   
  
  
IMPRESSION:   
  
No acute bony abnormality. 
  
  
Dictated by: Wilfredo Brady M.D. on 12/15/2024 at 12:09      
Approved by: Wilfredo Brady M.D. on 12/15/2024 at 12:10

## 2025-04-07 ENCOUNTER — HOSPITAL ENCOUNTER (OUTPATIENT)
Dept: HOSPITAL 73 - RAD | Age: 75
End: 2025-04-07
Payer: MEDICARE

## 2025-04-07 DIAGNOSIS — R07.9: Primary | ICD-10-CM

## 2025-04-07 PROCEDURE — 71046 X-RAY EXAM CHEST 2 VIEWS: CPT
